# Patient Record
Sex: FEMALE | Race: BLACK OR AFRICAN AMERICAN | NOT HISPANIC OR LATINO | Employment: UNEMPLOYED
[De-identification: names, ages, dates, MRNs, and addresses within clinical notes are randomized per-mention and may not be internally consistent; named-entity substitution may affect disease eponyms.]

---

## 2022-09-22 ENCOUNTER — TRANSCRIBE ORDERS (OUTPATIENT)
Dept: OTHER | Age: 37
End: 2022-09-22

## 2022-09-22 DIAGNOSIS — R53.1 WEAKNESS: ICD-10-CM

## 2022-09-22 DIAGNOSIS — E05.90 HYPERTHYROIDISM: ICD-10-CM

## 2022-09-22 DIAGNOSIS — G70.00 MYASTHENIA GRAVIS (H): ICD-10-CM

## 2022-09-22 DIAGNOSIS — H05.20 EXOPHTHALMOS: ICD-10-CM

## 2022-09-22 DIAGNOSIS — H02.401 PTOSIS OF RIGHT EYELID: Primary | ICD-10-CM

## 2022-09-22 DIAGNOSIS — H53.8 BLURRY VISION: ICD-10-CM

## 2022-09-23 ENCOUNTER — TELEPHONE (OUTPATIENT)
Dept: OPHTHALMOLOGY | Facility: CLINIC | Age: 37
End: 2022-09-23

## 2022-09-23 NOTE — TELEPHONE ENCOUNTER
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  Based on the availability of our provider(s), we are unable to accommodate this request.      Were all sites offered this patient?  Yes      Does scheduling algorithm request to schedule next available?  Patient has been scheduled for the first available opening with Dr Cam on 10/20.  We have informed the patient that the clinic will review their referral and reach out if a sooner appointment is medically necessary.

## 2022-09-26 NOTE — TELEPHONE ENCOUNTER
Can keep as scheduled as no indication in notes as to why needs to be seen sooner.      Lynn Harvey on 9/26/2022 at 2:22 PM

## 2022-10-03 ENCOUNTER — HEALTH MAINTENANCE LETTER (OUTPATIENT)
Age: 37
End: 2022-10-03

## 2022-10-20 ENCOUNTER — OFFICE VISIT (OUTPATIENT)
Dept: OPHTHALMOLOGY | Facility: CLINIC | Age: 37
End: 2022-10-20
Attending: PHYSICIAN ASSISTANT
Payer: MEDICAID

## 2022-10-20 DIAGNOSIS — H53.10 SUBJECTIVE VISUAL DISTURBANCE: Primary | ICD-10-CM

## 2022-10-20 DIAGNOSIS — H50.52 EXOPHORIA: ICD-10-CM

## 2022-10-20 DIAGNOSIS — H53.8 BLURRY VISION: ICD-10-CM

## 2022-10-20 DIAGNOSIS — H53.149 PHOTOPHOBIA: ICD-10-CM

## 2022-10-20 DIAGNOSIS — G70.00 MYASTHENIA GRAVIS (H): Primary | ICD-10-CM

## 2022-10-20 DIAGNOSIS — R53.1 WEAKNESS: ICD-10-CM

## 2022-10-20 DIAGNOSIS — H53.2 DIPLOPIA: ICD-10-CM

## 2022-10-20 DIAGNOSIS — E05.90 HYPERTHYROIDISM: ICD-10-CM

## 2022-10-20 DIAGNOSIS — H57.89 THYROID EYE DISEASE: ICD-10-CM

## 2022-10-20 DIAGNOSIS — H05.20 EXOPHTHALMOS: ICD-10-CM

## 2022-10-20 DIAGNOSIS — E07.9 THYROID EYE DISEASE: ICD-10-CM

## 2022-10-20 DIAGNOSIS — H02.401 PTOSIS OF RIGHT EYELID: ICD-10-CM

## 2022-10-20 PROBLEM — E32.9: Status: ACTIVE | Noted: 2022-08-30

## 2022-10-20 PROBLEM — E44.1 MILD PROTEIN-CALORIE MALNUTRITION (H): Status: ACTIVE | Noted: 2022-08-18

## 2022-10-20 PROBLEM — R55 SYNCOPE AND COLLAPSE: Status: ACTIVE | Noted: 2022-08-18

## 2022-10-20 PROCEDURE — 92060 SENSORIMOTOR EXAMINATION: CPT | Performed by: OPHTHALMOLOGY

## 2022-10-20 PROCEDURE — 99204 OFFICE O/P NEW MOD 45 MIN: CPT | Performed by: OPHTHALMOLOGY

## 2022-10-20 PROCEDURE — 92060 SENSORIMOTOR EXAMINATION: CPT | Mod: 26 | Performed by: OPHTHALMOLOGY

## 2022-10-20 PROCEDURE — G0463 HOSPITAL OUTPT CLINIC VISIT: HCPCS | Mod: 25

## 2022-10-20 RX ORDER — PYRIDOSTIGMINE BROMIDE 60 MG/1
60 TABLET ORAL
COMMUNITY
Start: 2022-08-30

## 2022-10-20 RX ORDER — PROPRANOLOL HYDROCHLORIDE 10 MG/1
10 TABLET ORAL
COMMUNITY
Start: 2022-09-21 | End: 2022-10-21

## 2022-10-20 RX ORDER — METHIMAZOLE 5 MG/1
5 TABLET ORAL
COMMUNITY
Start: 2022-09-21 | End: 2022-10-21

## 2022-10-20 ASSESSMENT — TONOMETRY
IOP_METHOD: TONOPEN
OS_IOP_MMHG: 12
OD_IOP_MMHG: 12

## 2022-10-20 ASSESSMENT — VISUAL ACUITY
OS_SC+: -2
METHOD: SNELLEN - LINEAR
OD_SC+: -2
OD_SC: 20/20
OS_SC: 20/25

## 2022-10-20 ASSESSMENT — MARGIN REFLEX DISTANCE
OD_MRD1: 4
OS_MRD1: 7

## 2022-10-20 ASSESSMENT — CONF VISUAL FIELD
OS_SUPERIOR_NASAL_RESTRICTION: 0
OD_NORMAL: 1
OS_NORMAL: 1
OS_INFERIOR_TEMPORAL_RESTRICTION: 0
OD_INFERIOR_TEMPORAL_RESTRICTION: 0
OS_INFERIOR_NASAL_RESTRICTION: 0
OS_SUPERIOR_TEMPORAL_RESTRICTION: 0
METHOD: COUNTING FINGERS
OD_SUPERIOR_TEMPORAL_RESTRICTION: 0
OD_INFERIOR_NASAL_RESTRICTION: 0
OD_SUPERIOR_NASAL_RESTRICTION: 0

## 2022-10-20 ASSESSMENT — LAGOPHTHALMOS
OS_LAGOPHTHALMOS: 0
OD_LAGOPHTHALMOS: 0

## 2022-10-20 NOTE — LETTER
10/20/2022         RE:  :  MRN: Dorothea Neely  1985  [id number]     Dear JAMIE Lenz,    Thank you for asking me to see your very pleasant patient, Dorothea Neely, in neuro-ophthalmic consultation.  I would like to thank you for sending your records and I have summarized them in the history of present illness.   My assessment and plan are below.  For further details, please see my attached clinic note.      Assessment & Plan      Dorothea Neely is a 37 year old female with the following diagnoses:   1. Myasthenia gravis (H)    2. Thyroid eye disease    3. Ptosis of right eyelid    4. Weakness    5. Hyperthyroidism    6. Exophthalmos    7. Blurry vision    8. Exophoria    9. Photophobia    10. Diplopia          Patient was sent for consultation by Damari Lenz PA-C for ptosis of the right eyelid and weakness.     HPI:  Ms. Neely is a 37 year old female with who presents with right sided ptosis, left sided exiohthalmos, diplopia, and blurred vision.     In 2022, Ms. Neely began experiencing intermittent diagonal diplopia, blurred vision, and ptosis of the right eye all worse in the evening, heat, and when fatigued daily. Around the same time she was experiencing other symptoms such as difficulty breathing, trouble swallowing, extreme fatigue, and headaches. Symptoms progressively began to worsen until she was hospitalized after fainting in 2022. Work up till that point was negative until ice pack test was performed on the right eye, improving the ptosis. Subsequent myasthenia labs were sent out although they have since resulted as negative. During that hospitalization, CT Chest showed a thymic mass measuring 3.0 x 1.8 x 3.8 cm in size. Ms. Neely has been placed on pyridostigmine with drastic improvement of her symptoms, now having ocular symptoms of myasthenia once or twice a week when fatigue only lasting for short periods of time (~15 minutes). She will be having surgery to remove the mass in  early November.      Concurrently, around the same time Ms. Neely was having other systemic symptoms such as heat intolerance, diarrhea, palpitations, anxiety, and a bulging left eye with other ocular symptoms such as swelling, tearing, and erythema that at the time were thought to be subjective because of right eyelid drooping. Thyroid labs were checked in July of 2022 and the diagnosis of Graves disease was made. She has since been started on propanolol and methimazole with resolution of the majority of her thyroid related symptoms except for the ocular manifestations. On review of systems, she does endorse eye pain, erythema of the left eye, swelling of the left eye, bulging of the left eye, and tearing but denies other visual symptoms such as changes in color vision, headaches, photopsia, or tinnitus.      Thyroid history: Graves Disease  Diagnosed when? July 2022  BARRERA: None  Thyroidectomy: Scheduled for early November     TSI (date): N/A    Previous results:  Ref Range & Units 4 wk ago     TSH 0.47 - 5.00 uIU/mL 0.04 Low        Ref Range & Units 4 wk ago     Free T4 0.70 - 2.00 ng/dL 0.90       Ref Range & Units 4 wk ago     Free T3 1.7 - 3.8 pg/mL 2.2       Ref Range & Units 2 mo ago     TSH 0.47 - 5.00 uIU/mL <0.01 Low        Ref Range & Units 2 mo ago     Free T4 0.70 - 2.00 ng/dL 1.58         Ref Range & Units 2 mo ago   Free T3 1.7 - 3.8 pg/mL 3.9 High        Ref Range & Units 2 mo ago     Thyrotropin Receptor Ab 0.00 - 1.75 IU/L 4.54 High              Eye symptoms (since when):   Proptosis (better/worse/same since last visit): Present left eye  Diplopia(better/worse/same since last visit): Present, better than when initially diagnosed  Eyelid retraction(better/worse/same since last visit): Present left eye  Tearing(better/worse/same since last visit): Present, both eye, L > R  Redness (better/worse/same since last visit): Present, right eye  Pain ((better/worse/same since last visit): None  Pain to move  the eyes (better/worse/same since last visit): None  Blurred vision: Present, R > L     Ocular history:   Orbital decompression (date, details): N/A  Strabismus surgery (date, details): N/A  Eyelid surgery (date, details): N/A     The patient is presenting with a chronic illness with mild exacerbation or progression.    Independent historians:  Patient     Review of outside testing:  MRI Brain WWO  08/18/2022  IMPRESSION:  1. Slight nonspecific white matter changes.   2. No acute intracranial abnormality, otherwise negative brain MRI.     MRV Brain WWO 08/18/2022  IMPRESSION:   1. No venous sinus thrombosis.     CT Chest W Contrast 08/18/2022  IMPRESSION:   1. Soft tissue identified within the anterior mediastinum in the region of the thymus measuring 3.0 x 1.8 x 3.8 cm in size.  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  No suspicious features.  Attention on follow-up.   2. No other suspicious findings on today's examination.             Ref Range & Units 1 mo ago   MuSK Autoantibody 0.00 - 0.02 nmol/L 0       Ref Range & Units 2 mo ago     ACh Receptor (Muscle) Binding Ab <=0.02 nmol/L 0          Paraneoplastic panel: Unremarkable     My interpretation performed today of outside testing:  I have independently reviewed radiologist reports. I am unable to review the images as they are not in our PACS system.     Review of outside clinical notes:  Consult note from Dr. Wayne George 09/21/2022  37-year-old female with medical history of iron deficiency anemia and depression who is seen in consultation for hyperthyroidism. Patient is accompanied by her cousin sister.    Patient reported she started having symptoms around 5 months ago in April 2022. She started experiencing heat intolerance, palpitations, anxiety, diarrhea, sleep disturbances, fatigue and lost almost 10 pounds of weight. She was not on any medications at that time. After that, she went to [place] for vacation in June 2022 and stayed there for  almost 6 weeks. Her symptoms got worse while she was in [place] and saw a local physician over there who as per patient's report check the thyroid labs and told her that she has hyperthyroidism and started her on propanolol and methimazole. Patient came back to [place] in July 2022.    She was on methimazole at that time. However, she went to the emergency department last month on 8/17/2022 after she had palpitations and an episode of syncope.  They checked her thyroid labs and her TSH came back undetectable with an elevated free T3 of 3.9. Further evaluation showed positive TRAB antibodies.     And the dose of methimazole was subsequently increased to 5 mg 3 times daily and she was kept on propanolol 10 mg twice daily. Prior to presenting the emergency department, patient reported she was taking methimazole 5 mg twice daily.  While patient was in the hospital, a CT scan was performed which showed a soft tissue in the anterior mediastinum in the region of thymus most likely thymic hyperplasia and she was told that she likely has myasthenia gravis and was also started on pyridostigmine. She was seen by the cardiac surgeon Dr. Sol. And there was a discussion about considering thymectomy. However, patient reported he was still waiting on the final confirmation of the diagnosis and then they may consider thymectomy near future. She met her PCP yesterday who has also referred her to the neuro-ophthalmologist    But she has not scheduled a visit yet. Patient's left eye has proptosis and she has also been noticing increased dryness and itching in the eyes. She is starting her propanolol and methimazole, her hyperadrenergic symptoms have improved and now she is gaining weight. Her main complaint is her visual symptoms.     Past medical history:         Patient Active Problem List   Diagnosis     Depression     Genital herpes     Thymus disorder (H)     Iron deficiency anemia     Mild protein-calorie malnutrition (H)      Syncope and collapse         Medications:   This patient does not have an active medication from one of the medication groupers.  - Alprazolam  - Hydroxyzine  - Methimazole  - Propanolol  - Pyridostigmine     Family history / social history:  Patient's family history is not on file.      Patient  reports that she has quit smoking. Her smoking use included cigarettes. She has quit using smokeless tobacco.      Exam:  Visual acuity 20/20 -2 right eye 20/25 -2 left eye.  Color vision 14/14 right eye and 14/14 left eye.  Pupils equal and reactive without rAPD. Intraocular pressure 12 right eye and 12 left eye.     Mary (base): Left eye 20/ Right eye 15 at 95   Better/worse same: Initial  Strabismus (better/worse/same): N/A  Eyelid retraction (better/worse/same): N/A     MCKINLEY Score:  1. Spontaneous orbital pain.                                                   0  2. Gaze evoked orbital pain.                                                   0  3. Eyelid swelling due to active thyroid eye disease              1  4. Eyelid erythema.                                                                 0  5. Conjunctival redness due to active thyroid eye disease .  1  6. Chemosis.                                                                           0  7. Inflammation of caruncle OR plica.                                    1     MCKINLEY = 3      Mcnamara Diplopia Score = 1  0 = no diplopia  1 = intermittent (when tired, upon waking, end of day etc)  2 = inconstant (extreme gazes)  3 = constant     Tests ordered and interpreted today:  None  Discussion of management / interpretation with another provider:   None     Assessment/Plan:   It is my impression that patient has well controlled myasthenia gravis along with thyroid eye disease.     Thyroid eye disease (CANDIS).  The natural history of thyroid eye disease was discussed. We told the patient that typically CANDIS will worsen for a period of time, then improve to some degree,  and then stabilize without normalizing.  This process can take somewhere between 1 and 3 years on average.  In the meantime, we recommended seeing the patient in the [facility] every 6 months (sooner if the patient experiences worsening vision in either eye).  Once the patient becomes stable for at least 6 months' time, we discussed that the patient may need restorative surgery or prisms.  Finally, we discussed that correcting the thyroid hormone levels does not ensure that the eyes will normalize but that it could help to some degree.       She also has myasthenia gravis along with a thymoma.  She will have a thymectomy in about 2 weeks or so.  She is well-controlled on mestinon alone.  She could consider the extended release form of mestinon.  We will wait until after her surgery to consider changing.       Again, thank you for allowing me to participate in the care of your patient.      Sincerely,    Chris Cam MD  Professor  Ophthalmology Residency   Director of Neuro-Ophthalmology  [place] - Scheie Endow Chair  Departments of Ophthalmology, Neurology, and Neurosurgery  [address]  [address]  T - [phone]   - [phone]  [email or web address]      CC: SADE MEDRANO  [facility]  [address] [facility] [address]  Via Fax: [phone]     Damari Lenz PA-C  [facility]  [address]  [place] [address]  Via Fax: [phone]    DX = Thyroid eye disease, myasthenia gravis, thymoma

## 2022-10-20 NOTE — PROGRESS NOTES
Assessment & Plan     Dorothea Neely is a 37 year old female with the following diagnoses:   1. Myasthenia gravis (H)    2. Thyroid eye disease    3. Ptosis of right eyelid    4. Weakness    5. Hyperthyroidism    6. Exophthalmos    7. Blurry vision    8. Exophoria    9. Photophobia    10. Diplopia         Patient was sent for consultation by Dr. Damari Lenz for ptosis of the right eyelid and weakness.    HPI:  Ms. Neely is a 37 year old female with who presents with right sided ptosis, left sided exiohthalmos, diplopia, and blurred vision.    In April of 2022, Ms. Neely began experiencing intermittent diagonal diplopia, blurred vision, and ptosis of the right eye all worse in the evening, heat, and when fatigued daily. Around the same time she was experiencing other symptoms such as difficulty breathing, trouble swallowing, extreme fatigue, and headaches. Symptoms progressively began to worsen until she was hospitalized after fainting in August of 2022. Work up till that point was negative until ice pack test was performed on the right eye, improving the ptosis. Subsequent myasthenia labs were sent out although they have since resulted as negative. During that hospitalization, CT Chest showed a thymic mass measuring 3.0 x 1.8 x 3.8 cm in size. Ms. Neely has been placed on pyridostigmine with drastic improvement of her symptoms, now having ocular symptoms of myasthenia once or twice a week when fatigue only lasting for short periods of time (~15 minutes). She will be having surgery to remove the mass in early November.     Concurrently, around the same time Ms. Neely was having other systemic symptoms such as heat intolerance, diarrhea, palpitations, anxiety, and a bulging left eye with other ocular symptoms such as swelling, tearing, and erythema that at the time were thought to be subjective because of right eyelid drooping. Thyroid labs were checked in July of 2022 and the diagnosis of Graves disease was made.  She has since been started on propanolol and methimazole with resolution of the majority of her thyroid related symptoms except for the ocular manifestations. On review of systems, she does endorse eye pain, erythema of the left eye, swelling of the left eye, bulging of the left eye, and tearing but denies other visual symptoms such as changes in color vision, headaches, photopsia, or tinnitus.     Thyroid history: Graves Disease  Diagnosed when? July 2022  BARRERA: None  Thyroidectomy: Scheduled for early November    TSI (date): N/A    Previous results:  Ref Range & Units 4 wk ago    TSH 0.47 - 5.00 uIU/mL 0.04 Low       Ref Range & Units 4 wk ago    Free T4 0.70 - 2.00 ng/dL 0.90      Ref Range & Units 4 wk ago    Free T3 1.7 - 3.8 pg/mL 2.2      Ref Range & Units 2 mo ago    TSH 0.47 - 5.00 uIU/mL <0.01 Low       Ref Range & Units 2 mo ago    Free T4 0.70 - 2.00 ng/dL 1.58       Ref Range & Units 2 mo ago   Free T3 1.7 - 3.8 pg/mL 3.9 High       Ref Range & Units 2 mo ago    Thyrotropin Receptor Ab 0.00 - 1.75 IU/L 4.54 High           Eye symptoms (since when):   Proptosis (better/worse/same since last visit): Present left eye  Diplopia(better/worse/same since last visit): Present, better than when initially diagnosed  Eyelid retraction(better/worse/same since last visit): Present left eye  Tearing(better/worse/same since last visit): Present, both eye, L > R  Redness (better/worse/same since last visit): Present, right eye  Pain ((better/worse/same since last visit): None  Pain to move the eyes (better/worse/same since last visit): None  Blurred vision: Present, R > L    Ocular history:   Orbital decompression (date, details): N/A  Strabismus surgery (date, details): N/A  Eyelid surgery (date, details): N/A    The patient is presenting with a chronic illness with mild exacerbation or progression.    Independent historians:  Patient    Review of outside testing:  MRI Brain O  08/18/2022  IMPRESSION:  1. Slight  nonspecific white matter changes.   2. No acute intracranial abnormality, otherwise negative brain MRI.    MRV Brain WWO 08/18/2022  IMPRESSION:   1. No venous sinus thrombosis.    CT Chest W Contrast 08/18/2022  IMPRESSION:   1. Soft tissue identified within the anterior mediastinum in the region of the thymus measuring 3.0 x 1.8 x 3.8 cm in size.  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  No suspicious features.  Attention on follow-up.   2. No other suspicious findings on today's examination.         Ref Range & Units 1 mo ago   MuSK Autoantibody 0.00 - 0.02 nmol/L 0      Ref Range & Units 2 mo ago    ACh Receptor (Muscle) Binding Ab <=0.02 nmol/L 0        Paraneoplastic panel: Unremarkable    My interpretation performed today of outside testing:  I have independently reviewed radiologist reports. I am unable to review the images as they are not in our PACS system.    Review of outside clinical notes:  Consult note from Dr. Wayne George 09/21/2022  37-year-old female with medical history of iron deficiency anemia and depression who is seen in consultation for hyperthyroidism. Patient is accompanied by her cousin sister.    Patient reported she started having symptoms around 5 months ago in April 2022. She started experiencing heat intolerance, palpitations, anxiety, diarrhea, sleep disturbances, fatigue and lost almost 10 pounds of weight. She was not on any medications at that time. After that, she went to [place] for vacation in June 2022 and stayed there for almost 6 weeks. Her symptoms got worse while she was in [place] and saw a local physician over there who as per patient's report check the thyroid labs and told her that she has hyperthyroidism and started her on propanolol and methimazole. Patient came back to [place] in July 2022.    She was on methimazole at that time. However, she went to the emergency department last month on 8/17/2022 after she had palpitations and an episode of  syncope.  They checked her thyroid labs and her TSH came back undetectable with an elevated free T3 of 3.9. Further evaluation showed positive TRAB antibodies.     And the dose of methimazole was subsequently increased to 5 mg 3 times daily and she was kept on propanolol 10 mg twice daily. Prior to presenting the emergency department, patient reported she was taking methimazole 5 mg twice daily.  While patient was in the hospital, a CT scan was performed which showed a soft tissue in the anterior mediastinum in the region of thymus most likely thymic hyperplasia and she was told that she likely has myasthenia gravis and was also started on pyridostigmine. She was seen by the cardiac surgeon Dr. Sol. And there was a discussion about considering thymectomy. However, patient reported he was still waiting on the final confirmation of the diagnosis and then they may consider thymectomy near future. She met her PCP yesterday who has also referred her to the neuro-ophthalmologist    But she has not scheduled a visit yet. Patient's left eye has proptosis and she has also been noticing increased dryness and itching in the eyes. She is starting her propanolol and methimazole, her hyperadrenergic symptoms have improved and now she is gaining weight. Her main complaint is her visual symptoms.    Past medical history:    Patient Active Problem List   Diagnosis     Depression     Genital herpes     Thymus disorder (H)     Iron deficiency anemia     Mild protein-calorie malnutrition (H)     Syncope and collapse       Medications:   This patient does not have an active medication from one of the medication groupers.  - Alprazolam  - Hydroxyzine  - Methimazole  - Propanolol  - Pyridostigmine    Family history / social history:  Patient's family history is not on file.     Patient  reports that she has quit smoking. Her smoking use included cigarettes. She has quit using smokeless tobacco.     Exam:  Visual acuity 20/20 -2 right  eye 20/25 -2 left eye.  Color vision 14/14 right eye and 14/14 left eye.  Pupils equal and reactive without rAPD. Intraocular pressure 12 right eye and 12 left eye.      Mary (base): Left eye 20/ Right eye 15 at 95   Better/worse same: Initial  Strabismus (better/worse/same): N/A  Eyelid retraction (better/worse/same): N/A    MCKINLEY Score:  1. Spontaneous orbital pain.     0  2. Gaze evoked orbital pain.     0  3. Eyelid swelling due to active thyroid eye disease  1  4. Eyelid erythema.      0  5. Conjunctival redness due to active thyroid eye disease . 1  6. Chemosis.        0  7. Inflammation of caruncle OR plica.   1    MCKINLEY = 3     Mcnamara Diplopia Score = 1  0 = no diplopia  1 = intermittent (when tired, upon waking, end of day etc)  2 = inconstant (extreme gazes)  3 = constant    Tests ordered and interpreted today:  None    Discussion of management / interpretation with another provider:   None    Assessment/Plan:   It is my impression that patient has well controlled myasthenia gravis along with thyroid eye disease.    Thyroid eye disease (CANDIS).  The natural history of thyroid eye disease was discussed. We told the patient that typically CANDIS will worsen for a period of time, then improve to some degree, and then stabilize without normalizing.  This process can take somewhere between 1 and 3 years on average.  In the meantime, we recommended seeing the patient in the [facility] every 6 months (sooner if the patient experiences worsening vision in either eye).  Once the patient becomes stable for at least 6 months' time, we discussed that the patient may need restorative surgery or prisms.  Finally, we discussed that correcting the thyroid hormone levels does not ensure that the eyes will normalize but that it could help to some degree.      She also has myasthenia gravis along with a thymoma.  She will have a thymectomy in about 2 weeks or so.  She is well-controlled on mestinon alone.  She  could consider the extended release form of mestinon.  We will wait until after her surgery to consider changing.         Attending Physician Attestation:  Complete documentation of historical and exam elements from today's encounter can be found in the full encounter summary report (not reduplicated in this progress note).  I personally obtained the chief complaint(s) and history of present illness.  I confirmed and edited as necessary the review of systems, past medical/surgical history, family history, social history, and examination findings as documented by others; and I examined the patient myself.  I personally reviewed the relevant tests, images, and reports as documented above.  I formulated and edited as necessary the assessment and plan and discussed the findings and management plan with the patient and family. I was present with the medical student who participated in the service and in the documentation of this note. - MD Martinez Roberson, MS4    Precharting:  Martinez Coughiln, MS4

## 2022-10-20 NOTE — NURSING NOTE
"Chief Complaint(s) and History of Present Illness(es)     New Patient    In both eyes (Myasthenia Gravis).  Charactertized as  blurred vision.  Since onset it is gradually improving.  Associated symptoms include double vision, dryness and scalp tenderness.  Negative for eye pain, headache and jaw claudication (+watery eyes, using OTC art tears).  Pain was noted as 0/10.           Comments    Patient was sent for consultation by Dr. Lenz for recently being diagnosed with: Graves, Myasthenia, and ptosis.  Two weeks prior to her recent ED 08/17/22, she was noticing RUL ptosis and right facial drooping.  She was recently dx with hyperthyroidism and started taking Methimazole 2 weeks prior to her ED admission.  Due to her symptoms, she had workup for MG in the ED and the diagnosis was confirmed.  They started her on Mestinon 60mg three times daily.  She has having worsening blurry vision and diplopia but they have improved since being on Mestinon.  She feels some scalp tenderness on the top of her head leading to headaches.     Per Dr. Lenz's notes: \"\"During her hospitalization, neurology was consulted and she was observed to have ptosis, fatigable weakness, and positive cold ice test consistent with autoimmune mediated acquired myasthenia gravis. She was started on Mestinon with improvement in her symptoms. CT of the chest revealed thymoma. Other work-up included normal EEG, normal echocardiogram, normal MRI of the brain as well as MRV of the brain. Paraneoplastic autoantibody panel was negative. Myasthenia gravis receptor binding antibodies were negative. Musk reflex was negative... Based on her symptoms, work-up for myasthenia gravis was initiated. CT scan revealed thymoma. She was diagnosed with myasthenia gravis and was started on Mestinon 60 mg 3 times daily.\"    YULIANA Prince 10/20/2022 1:26 PM                   "

## 2022-10-20 NOTE — PATIENT INSTRUCTIONS
You are sensitive to the light.  There is generally no known cause for this.  There is a specialized tint called FL-41 that blocks a certain wavelength of light known to cause light sensitivity. Your eyeglass lenses can be tinted with this at just about any eyeglass store but not all stores carry this tint.  You should call before visiting the store.  The store may try to substitute with a tint that they have in stock, but I would recommend against it.  There is also a company that will sell FL-41 tinted lenses online at [employer] or [employer].      Generally speaking, when you are at home, try not to wear sunglasses inside, especially the evenings.  The more you wear them, the less tolerant of light you become.  This does not apply to the FL-41 lenses since they are not that dark.  There are also smart LED light bulbs that can be controlled from your smart phone.  They can be made any color and any brightness.  You may find that there is a particular color and brightness that helps you.      There are tinted contact lenses.  These can be made using the FL-41 tint or another color.  They are very custom made and so they tend to cost hundreds of dollars.      You can also consider wearing a hat, tinting your windshield, get a shield above your cubicle, tinting the windows in your home and change the bulbs in your office.

## 2022-10-20 NOTE — Clinical Note
10/20/2022       RE: Dorothea Neely  [address] [place] [address]     Dear Colleague,    Thank you for referring your patient, Dorothea Neely, to the [facility] [place] at [facility]. Please see a copy of my visit note below.        Assessment & Plan     Dorothea Neely is a 37 year old female with the following diagnoses:   1. Myasthenia gravis (H)    2. Thyroid eye disease    3. Ptosis of right eyelid    4. Weakness    5. Hyperthyroidism    6. Exophthalmos    7. Blurry vision    8. Exophoria    9. Photophobia    10. Diplopia         Patient was sent for consultation by Dr. Damari Lenz for ptosis of the right eyelid and weakness.    HPI:  Ms. Neely is a 37 year old female with who presents with right sided ptosis, left sided exiohthalmos, diplopia, and blurred vision.    In April of 2022, Ms. Neely began experiencing intermittent diagonal diplopia, blurred vision, and ptosis of the right eye all worse in the evening, heat, and when fatigued daily. Around the same time she was experiencing other symptoms such as difficulty breathing, trouble swallowing, extreme fatigue, and headaches. Symptoms progressively began to worsen until she was hospitalized after fainting in August of 2022. Work up till that point was negative until ice pack test was performed on the right eye, improving the ptosis. Subsequent myasthenia labs were sent out although they have since resulted as negative. During that hospitalization, CT Chest showed a thymic mass measuring 3.0 x 1.8 x 3.8 cm in size. Ms. Neely has been placed on pyridostigmine with drastic improvement of her symptoms, now having ocular symptoms of myasthenia once or twice a week when fatigue only lasting for short periods of time (~15 minutes). She will be having surgery to remove the mass in early November.     Concurrently, around the same time Ms. Neely was having other systemic symptoms such as  heat intolerance, diarrhea, palpitations, anxiety, and a bulging left eye with other ocular symptoms such as swelling, tearing, and erythema that at the time were thought to be subjective because of right eyelid drooping. Thyroid labs were checked in July of 2022 and the diagnosis of Graves disease was made. She has since been started on propanolol and methimazole with resolution of the majority of her thyroid related symptoms except for the ocular manifestations. On review of systems, she does endorse eye pain, erythema of the left eye, swelling of the left eye, bulging of the left eye, and tearing but denies other visual symptoms such as changes in color vision, headaches, photopsia, or tinnitus.     Thyroid history: Graves Disease  Diagnosed when? July 2022  BARRERA: None  Thyroidectomy: Scheduled for early November    TSI (date): N/A    Previous results:  Ref Range & Units 4 wk ago    TSH 0.47 - 5.00 uIU/mL 0.04 Low       Ref Range & Units 4 wk ago    Free T4 0.70 - 2.00 ng/dL 0.90      Ref Range & Units 4 wk ago    Free T3 1.7 - 3.8 pg/mL 2.2      Ref Range & Units 2 mo ago    TSH 0.47 - 5.00 uIU/mL <0.01 Low       Ref Range & Units 2 mo ago    Free T4 0.70 - 2.00 ng/dL 1.58       Ref Range & Units 2 mo ago   Free T3 1.7 - 3.8 pg/mL 3.9 High       Ref Range & Units 2 mo ago    Thyrotropin Receptor Ab 0.00 - 1.75 IU/L 4.54 High           Eye symptoms (since when):   Proptosis (better/worse/same since last visit): Present left eye  Diplopia(better/worse/same since last visit): Present, better than when initially diagnosed  Eyelid retraction(better/worse/same since last visit): Present left eye  Tearing(better/worse/same since last visit): Present, both eye, L > R  Redness (better/worse/same since last visit): Present, right eye  Pain ((better/worse/same since last visit): None  Pain to move the eyes (better/worse/same since last visit): None  Blurred vision: Present, R > L    Ocular history:   Orbital decompression  (date, details): N/A  Strabismus surgery (date, details): N/A  Eyelid surgery (date, details): N/A    The patient is presenting with a chronic illness with mild exacerbation or progression.    Independent historians:  Patient    Review of outside testing:  MRI Brain WWO  08/18/2022  IMPRESSION:  1. Slight nonspecific white matter changes.   2. No acute intracranial abnormality, otherwise negative brain MRI.    MRV Brain WWO 08/18/2022  IMPRESSION:   1. No venous sinus thrombosis.    CT Chest W Contrast 08/18/2022  IMPRESSION:   1. Soft tissue identified within the anterior mediastinum in the region of the thymus measuring 3.0 x 1.8 x 3.8 cm in size.  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  No suspicious features.  Attention on follow-up.   2. No other suspicious findings on today's examination.         Ref Range & Units 1 mo ago   MuSK Autoantibody 0.00 - 0.02 nmol/L 0      Ref Range & Units 2 mo ago    ACh Receptor (Muscle) Binding Ab <=0.02 nmol/L 0        Paraneoplastic panel: Unremarkable    My interpretation performed today of outside testing:  I have independently reviewed radiologist reports. I am unable to review the images as they are not in our PACS system.    Review of outside clinical notes:  Consult note from Dr. Wayne George 09/21/2022  37-year-old female with medical history of iron deficiency anemia and depression who is seen in consultation for hyperthyroidism. Patient is accompanied by her cousin sister.    Patient reported she started having symptoms around 5 months ago in April 2022. She started experiencing heat intolerance, palpitations, anxiety, diarrhea, sleep disturbances, fatigue and lost almost 10 pounds of weight. She was not on any medications at that time. After that, she went to [place] for vacation in June 2022 and stayed there for almost 6 weeks. Her symptoms got worse while she was in [place] and saw a local physician over there who as per patient's report check the thyroid  labs and told her that she has hyperthyroidism and started her on propanolol and methimazole. Patient came back to [place] in July 2022.    She was on methimazole at that time. However, she went to the emergency department last month on 8/17/2022 after she had palpitations and an episode of syncope.  They checked her thyroid labs and her TSH came back undetectable with an elevated free T3 of 3.9. Further evaluation showed positive TRAB antibodies.     And the dose of methimazole was subsequently increased to 5 mg 3 times daily and she was kept on propanolol 10 mg twice daily. Prior to presenting the emergency department, patient reported she was taking methimazole 5 mg twice daily.  While patient was in the hospital, a CT scan was performed which showed a soft tissue in the anterior mediastinum in the region of thymus most likely thymic hyperplasia and she was told that she likely has myasthenia gravis and was also started on pyridostigmine. She was seen by the cardiac surgeon Dr. Sol. And there was a discussion about considering thymectomy. However, patient reported he was still waiting on the final confirmation of the diagnosis and then they may consider thymectomy near future. She met her PCP yesterday who has also referred her to the neuro-ophthalmologist    But she has not scheduled a visit yet. Patient's left eye has proptosis and she has also been noticing increased dryness and itching in the eyes. She is starting her propanolol and methimazole, her hyperadrenergic symptoms have improved and now she is gaining weight. Her main complaint is her visual symptoms.    Past medical history:    Patient Active Problem List   Diagnosis     Depression     Genital herpes     Thymus disorder (H)     Iron deficiency anemia     Mild protein-calorie malnutrition (H)     Syncope and collapse       Medications:   This patient does not have an active medication from one of the medication groupers.  - Alprazolam  -  Hydroxyzine  - Methimazole  - Propanolol  - Pyridostigmine    Family history / social history:  Patient's family history is not on file.     Patient  reports that she has quit smoking. Her smoking use included cigarettes. She has quit using smokeless tobacco.     Exam:  Visual acuity 20/20 -2 right eye 20/25 -2 left eye.  Color vision 14/14 right eye and 14/14 left eye.  Pupils equal and reactive without rAPD. Intraocular pressure 12 right eye and 12 left eye.  Anterior segment exam ***.  Fundus exam ***.  Strabismus exam ortho.     Mary (base): Left eye 20/ Right eye 15 at 95   Better/worse same: Initial  Strabismus (better/worse/same): N/A  Eyelid retraction (better/worse/same): N/A    MCKINLEY Score:  1. Spontaneous orbital pain.     0  2. Gaze evoked orbital pain.     0  3. Eyelid swelling due to active thyroid eye disease  1  4. Eyelid erythema.      0  5. Conjunctival redness due to active thyroid eye disease . 1  6. Chemosis.        0  7. Inflammation of caruncle OR plica.   1    MCKINLEY = 3     Mcnamara Diplopia Score = 1  0 = no diplopia  1 = intermittent (when tired, upon waking, end of day etc)  2 = inconstant (extreme gazes)  3 = constant    Tests ordered and interpreted today:  None    Discussion of management / interpretation with another provider:   None    Assessment/Plan:   It is my impression that patient has well controlled myasthenia gravis along with thyroid eye disease.    Thyroid eye disease (CANDIS).  The natural history of thyroid eye disease was discussed. We told the patient that typically CANDIS will worsen for a period of time, then improve to some degree, and then stabilize without normalizing.  This process can take somewhere between 1 and 3 years on average.  In the meantime, we recommended seeing the patient in the [facility] 6 months (sooner if the patient experiences worsening vision in either eye).  Once the patient becomes stable for at least 6 months' time, we  discussed that the patient may need restorative surgery or prisms.  Finally, we discussed that correcting the thyroid hormone levels does not ensure that the eyes will normalize but that it could help to some degree.      She also has myasthenia gravis along with a thymoma.  She will have a thymectomy in about 2 weeks or so.  She is well-controlled on mestinon alone.  She could consider the extended release form of mestinon.  We will wait until after her surgery to consider changing.         Attending Physician Attestation:  Complete documentation of historical and exam elements from today's encounter can be found in the full encounter summary report (not reduplicated in this progress note).  I personally obtained the chief complaint(s) and history of present illness.  I confirmed and edited as necessary the review of systems, past medical/surgical history, family history, social history, and examination findings as documented by others; and I examined the patient myself.  I personally reviewed the relevant tests, images, and reports as documented above.  I formulated and edited as necessary the assessment and plan and discussed the findings and management plan with the patient and family. I was present with the medical student who participated in the service and in the documentation of this note. - MD Martinez Roberson, MS4    Precharting:  Martinez Coughlin, MS4          Again, thank you for allowing me to participate in the care of your patient.      Sincerely,    Chris Cam MD

## 2023-10-22 ENCOUNTER — HEALTH MAINTENANCE LETTER (OUTPATIENT)
Age: 38
End: 2023-10-22

## 2024-03-10 ENCOUNTER — HEALTH MAINTENANCE LETTER (OUTPATIENT)
Age: 39
End: 2024-03-10

## 2024-12-15 ENCOUNTER — HEALTH MAINTENANCE LETTER (OUTPATIENT)
Age: 39
End: 2024-12-15